# Patient Record
Sex: FEMALE | Race: OTHER | HISPANIC OR LATINO | URBAN - METROPOLITAN AREA
[De-identification: names, ages, dates, MRNs, and addresses within clinical notes are randomized per-mention and may not be internally consistent; named-entity substitution may affect disease eponyms.]

---

## 2021-07-06 ENCOUNTER — EMERGENCY (EMERGENCY)
Facility: HOSPITAL | Age: 21
LOS: 0 days | Discharge: HOME | End: 2021-07-06
Attending: EMERGENCY MEDICINE | Admitting: EMERGENCY MEDICINE
Payer: COMMERCIAL

## 2021-07-06 VITALS
TEMPERATURE: 99 F | OXYGEN SATURATION: 98 % | HEART RATE: 86 BPM | SYSTOLIC BLOOD PRESSURE: 126 MMHG | RESPIRATION RATE: 17 BRPM | DIASTOLIC BLOOD PRESSURE: 67 MMHG | WEIGHT: 103.18 LBS

## 2021-07-06 DIAGNOSIS — R20.2 PARESTHESIA OF SKIN: ICD-10-CM

## 2021-07-06 DIAGNOSIS — R20.0 ANESTHESIA OF SKIN: ICD-10-CM

## 2021-07-06 DIAGNOSIS — Z79.899 OTHER LONG TERM (CURRENT) DRUG THERAPY: ICD-10-CM

## 2021-07-06 DIAGNOSIS — G40.909 EPILEPSY, UNSPECIFIED, NOT INTRACTABLE, WITHOUT STATUS EPILEPTICUS: ICD-10-CM

## 2021-07-06 LAB
ALBUMIN SERPL ELPH-MCNC: 4.8 G/DL — SIGNIFICANT CHANGE UP (ref 3.5–5.2)
ALP SERPL-CCNC: 79 U/L — SIGNIFICANT CHANGE UP (ref 30–115)
ALT FLD-CCNC: 9 U/L — LOW (ref 14–37)
ANION GAP SERPL CALC-SCNC: 7 MMOL/L — SIGNIFICANT CHANGE UP (ref 7–14)
AST SERPL-CCNC: 13 U/L — LOW (ref 14–37)
BASOPHILS # BLD AUTO: 0.04 K/UL — SIGNIFICANT CHANGE UP (ref 0–0.2)
BASOPHILS NFR BLD AUTO: 0.5 % — SIGNIFICANT CHANGE UP (ref 0–1)
BILIRUB SERPL-MCNC: <0.2 MG/DL — SIGNIFICANT CHANGE UP (ref 0.2–1.2)
BUN SERPL-MCNC: 12 MG/DL — SIGNIFICANT CHANGE UP (ref 10–20)
CALCIUM SERPL-MCNC: 9.4 MG/DL — SIGNIFICANT CHANGE UP (ref 8.5–10.1)
CHLORIDE SERPL-SCNC: 105 MMOL/L — SIGNIFICANT CHANGE UP (ref 98–110)
CO2 SERPL-SCNC: 24 MMOL/L — SIGNIFICANT CHANGE UP (ref 17–32)
CREAT SERPL-MCNC: 0.8 MG/DL — SIGNIFICANT CHANGE UP (ref 0.7–1.5)
EOSINOPHIL # BLD AUTO: 0.09 K/UL — SIGNIFICANT CHANGE UP (ref 0–0.7)
EOSINOPHIL NFR BLD AUTO: 1.1 % — SIGNIFICANT CHANGE UP (ref 0–8)
GLUCOSE SERPL-MCNC: 76 MG/DL — SIGNIFICANT CHANGE UP (ref 70–99)
HCT VFR BLD CALC: 33.1 % — LOW (ref 37–47)
HGB BLD-MCNC: 10.3 G/DL — LOW (ref 12–16)
IMM GRANULOCYTES NFR BLD AUTO: 0.4 % — HIGH (ref 0.1–0.3)
LYMPHOCYTES # BLD AUTO: 3.2 K/UL — SIGNIFICANT CHANGE UP (ref 1.2–3.4)
LYMPHOCYTES # BLD AUTO: 37.3 % — SIGNIFICANT CHANGE UP (ref 20.5–51.1)
MAGNESIUM SERPL-MCNC: 2 MG/DL — SIGNIFICANT CHANGE UP (ref 1.8–2.4)
MCHC RBC-ENTMCNC: 23 PG — LOW (ref 27–31)
MCHC RBC-ENTMCNC: 31.1 G/DL — LOW (ref 32–37)
MCV RBC AUTO: 73.9 FL — LOW (ref 81–99)
MONOCYTES # BLD AUTO: 0.81 K/UL — HIGH (ref 0.1–0.6)
MONOCYTES NFR BLD AUTO: 9.5 % — HIGH (ref 1.7–9.3)
NEUTROPHILS # BLD AUTO: 4.4 K/UL — SIGNIFICANT CHANGE UP (ref 1.4–6.5)
NEUTROPHILS NFR BLD AUTO: 51.2 % — SIGNIFICANT CHANGE UP (ref 42.2–75.2)
NRBC # BLD: 0 /100 WBCS — SIGNIFICANT CHANGE UP (ref 0–0)
PLATELET # BLD AUTO: 349 K/UL — SIGNIFICANT CHANGE UP (ref 130–400)
POTASSIUM SERPL-MCNC: 3.8 MMOL/L — SIGNIFICANT CHANGE UP (ref 3.5–5)
POTASSIUM SERPL-SCNC: 3.8 MMOL/L — SIGNIFICANT CHANGE UP (ref 3.5–5)
PROT SERPL-MCNC: 7.2 G/DL — SIGNIFICANT CHANGE UP (ref 6–8)
RBC # BLD: 4.48 M/UL — SIGNIFICANT CHANGE UP (ref 4.2–5.4)
RBC # FLD: 18.6 % — HIGH (ref 11.5–14.5)
SODIUM SERPL-SCNC: 136 MMOL/L — SIGNIFICANT CHANGE UP (ref 135–146)
WBC # BLD: 8.57 K/UL — SIGNIFICANT CHANGE UP (ref 4.8–10.8)
WBC # FLD AUTO: 8.57 K/UL — SIGNIFICANT CHANGE UP (ref 4.8–10.8)

## 2021-07-06 PROCEDURE — 99284 EMERGENCY DEPT VISIT MOD MDM: CPT

## 2021-07-06 NOTE — ED PROVIDER NOTE - CARE PROVIDER_API CALL
Lucas Roman)  Neurology  [address] [place] [address], [address]  [place] [address]  Phone: [phone]  Fax: [phone]  Follow Up Time:

## 2021-07-06 NOTE — ED PROVIDER NOTE - OBJECTIVE STATEMENT
20F PMHx epilepsy on Vimpat and lamotrigine presents to ED for ascending burning pricking pain x 2 days that started in LEs and has ascended to mid-back today. Reports pain is intermittent, no clear triggers, comes as a sudden burning sensation in body and resolves. Unclear aggravating/alleviating factors. No chest pain, sob, abd pain, n/v/d. No weakness, no headache, no fatigue. No recent travel, no new foods. Reports she was at home the past 2 days watching TV/normal routine. Last seizure over 5 years ago.

## 2021-07-06 NOTE — ED PROVIDER NOTE - ATTENDING CONTRIBUTION TO CARE
20F PMHx epilepsy on Vimpat and lamotrigine here for eval of paresthesia's. Pt reports pricking pain to lower ext that ascends to mid back. This is associated with paresthesia's to bl upper ext. no weakness, fever, chills, recent uri, seizures recently. awake alert, sitting up in bed smiling upon approach, neck supple, perrla eomi, cn 2-12 intact, motor 5/5 X4, sensation grossly intact. normal finger to nose, normal rapid alt mvt, normal gait s1s2 ctab soft nt/nd, normal gait.       impression paresthesia's to BL arms, labs grossly wnl, pt advised neuro f/u. mom and daughter to f/u as outpt

## 2021-07-06 NOTE — ED PROVIDER NOTE - PATIENT PORTAL LINK FT
You can access the FollowMyHealth Patient Portal offered by [facility] by registering at the following website: [email or web address]. By joining [facility]’s FollowMyHealth portal, you will also be able to view your health information using other applications (apps) compatible with our system.

## 2021-07-06 NOTE — ED PEDIATRIC NURSE NOTE - OBJECTIVE STATEMENT
Pt. complains of feelings of "needle prick" all over her body. Pt. also has complaints of facial numbness. Pt. denies any other symptoms

## 2021-07-06 NOTE — ED PROVIDER NOTE - NS ED ROS FT
Constitutional: (-) diaphoresis  Eyes/ENT: (-) runny nose  Cardiovascular: (-) chest pain  Respiratory: (-) cough  Gastrointestinal: (-) vomiting, (-) diarrhea  : (-) dysuria   Musculoskeletal: (-) joint pain  Integumentary: (-) rash  Neurological: (-) LOC. see HPI  Allergic/Immunologic: (-) pruritus

## 2021-07-06 NOTE — ED PROVIDER NOTE - PHYSICAL EXAMINATION
VITAL SIGNS: I have reviewed nursing notes and confirm.  CONSTITUTIONAL: well-appearing young woman, non-toxic, NAD  SKIN: Warm dry, normal skin turgor  HEAD: NCAT  EYES: EOMI without blurry vision/pain with movement, no nystagmus PERRL, no scleral icterus  ENT: Moist mucous membranes, normal pharynx with no erythema or exudates  NECK: Supple; non tender. Full ROM. No cervical LAD  CARD: RRR, no murmurs, rubs or gallops  RESP: clear to ausculation b/l.  No rales, rhonchi, or wheezing.  ABD: soft, non-tender, non-distended, no rebound or guarding.   EXT: Full ROM, no bony tenderness, no pedal edema, no calf tenderness  NEURO: normal motor. normal sensory. CN II-XII intact. Cerebellar testing normal. Normal gait.  PSYCH: Cooperative, appropriate.

## 2021-07-06 NOTE — ED PROVIDER NOTE - NSFOLLOWUPINSTRUCTIONS_ED_ALL_ED_FT
Follow up with a neurologist. A copy of your lab results is attached.    WHAT YOU NEED TO KNOW:    Paresthesia is numbness, tingling, or burning. It can happen in any part of your body, but usually occurs in your legs, feet, arms, or hands.    DISCHARGE INSTRUCTIONS:    Return to the emergency department if:     You have severe pain along with numbness and tingling.  Your legs suddenly become cold. You have trouble moving your legs, and they ache.  You have increased weakness in a part of your body.  You have uncontrolled movements.    Contact your healthcare provider or neurologist if:     Your symptoms do not improve.  You have symptoms in more than one part of your body.  You have questions or concerns about your condition or care.    Manage paresthesia:     Protect the area from injury. You may injure or burn yourself if you lose feeling in the area. Be careful when you touch anything that could be hot. Wear sturdy shoes to protect your feet. Ask about other ways to protect yourself.   Go to physical or occupational therapy if directed. Your provider may recommend therapy if you have a condition such as carpal tunnel syndrome. A physical therapist can teach you exercises to help strengthen the area or increase your ability to move it. An occupational therapist can help you find new ways to do your daily activities.  Manage health conditions that can cause paresthesia. Work with your diabetes specialist if you have uncontrolled diabetes. A dietitian or your healthcare provider can help you create a meal plan if you have low vitamin B levels. Your provider can help you manage your health if you have multiple sclerosis or you had a stroke. It is important to manage health conditions to stop paresthesia or prevent it from getting worse.  Follow up with your healthcare provider or neurologist as directed: Your healthcare provider may refer you to a specialist. Write down your questions so you remember to ask them during your visits.

## 2021-11-23 ENCOUNTER — TRANSCRIPTION ENCOUNTER (OUTPATIENT)
Age: 21
End: 2021-11-23

## 2024-06-20 PROBLEM — Z00.00 ENCOUNTER FOR PREVENTIVE HEALTH EXAMINATION: Status: ACTIVE | Noted: 2024-06-20

## 2024-06-26 ENCOUNTER — APPOINTMENT (OUTPATIENT)
Dept: OBGYN | Facility: CLINIC | Age: 24
End: 2024-06-26
Payer: COMMERCIAL

## 2024-06-26 VITALS
WEIGHT: 118 LBS | HEIGHT: 60 IN | DIASTOLIC BLOOD PRESSURE: 75 MMHG | BODY MASS INDEX: 23.16 KG/M2 | SYSTOLIC BLOOD PRESSURE: 111 MMHG

## 2024-06-26 DIAGNOSIS — N94.6 DYSMENORRHEA, UNSPECIFIED: ICD-10-CM

## 2024-06-26 DIAGNOSIS — Z86.69 PERSONAL HISTORY OF OTHER DISEASES OF THE NERVOUS SYSTEM AND SENSE ORGANS: ICD-10-CM

## 2024-06-26 DIAGNOSIS — N92.6 IRREGULAR MENSTRUATION, UNSPECIFIED: ICD-10-CM

## 2024-06-26 DIAGNOSIS — N92.0 EXCESSIVE AND FREQUENT MENSTRUATION WITH REGULAR CYCLE: ICD-10-CM

## 2024-06-26 DIAGNOSIS — Z01.411 ENCOUNTER FOR GYNECOLOGICAL EXAMINATION (GENERAL) (ROUTINE) WITH ABNORMAL FINDINGS: ICD-10-CM

## 2024-06-26 PROCEDURE — 99213 OFFICE O/P EST LOW 20 MIN: CPT | Mod: 25

## 2024-06-26 PROCEDURE — 76830 TRANSVAGINAL US NON-OB: CPT

## 2024-06-26 PROCEDURE — 99385 PREV VISIT NEW AGE 18-39: CPT

## 2024-07-23 ENCOUNTER — NON-APPOINTMENT (OUTPATIENT)
Age: 24
End: 2024-07-23

## 2024-08-19 ENCOUNTER — APPOINTMENT (OUTPATIENT)
Dept: OBGYN | Facility: CLINIC | Age: 24
End: 2024-08-19
Payer: COMMERCIAL

## 2024-08-19 VITALS — DIASTOLIC BLOOD PRESSURE: 63 MMHG | SYSTOLIC BLOOD PRESSURE: 109 MMHG | HEIGHT: 60 IN

## 2024-08-19 DIAGNOSIS — N92.6 IRREGULAR MENSTRUATION, UNSPECIFIED: ICD-10-CM

## 2024-08-19 DIAGNOSIS — Z30.09 ENCOUNTER FOR OTHER GENERAL COUNSELING AND ADVICE ON CONTRACEPTION: ICD-10-CM

## 2024-08-19 DIAGNOSIS — N92.0 EXCESSIVE AND FREQUENT MENSTRUATION WITH REGULAR CYCLE: ICD-10-CM

## 2024-08-19 PROCEDURE — 99213 OFFICE O/P EST LOW 20 MIN: CPT

## 2024-08-19 NOTE — DISCUSSION/SUMMARY
[FreeTextEntry1] : Contraceptive options were discussed with the patient. Options include but are not limited to the option of barrier contraception, oral contraceptives,Depo Provera injections, long acting reversible contraceptives such as intrauterine devices (non-hormonal vs hormonal), and Nexplanon. The risks, benefits, and alternatives to these treatments were also discussed with the patient.  Patient request hormonal IUD Issues regarding hormonal IUDs discussed with patient including differences between Mirena versus Aury She understands risks benefits and alternatives Will order intrauterine device Patient will return for insertion of intrauterine device

## 2024-08-19 NOTE — HISTORY OF PRESENT ILLNESS
[FreeTextEntry1] : Patient is 23 years old para 0-0-0-0 last menstrual period August 8, 2024 Patient states that she has a history of painful and heavy menstrual periods Patient has a history of failed attempt for IUD placement Patient would like to discuss option of hormonal IUD placement

## 2024-08-19 NOTE — PHYSICAL EXAM
[Chaperone Present] : A chaperone was present in the examining room during all aspects of the physical examination [FreeTextEntry2] : Shelly

## 2024-09-30 DIAGNOSIS — N88.2 STRICTURE AND STENOSIS OF CERVIX UTERI: ICD-10-CM

## 2024-09-30 RX ORDER — MISOPROSTOL 200 UG/1
200 TABLET ORAL
Qty: 2 | Refills: 0 | Status: ACTIVE | COMMUNITY
Start: 2024-09-30 | End: 1900-01-01

## 2024-10-01 ENCOUNTER — APPOINTMENT (OUTPATIENT)
Dept: OBGYN | Facility: CLINIC | Age: 24
End: 2024-10-01
Payer: COMMERCIAL

## 2024-10-01 VITALS — DIASTOLIC BLOOD PRESSURE: 76 MMHG | SYSTOLIC BLOOD PRESSURE: 114 MMHG | HEIGHT: 60 IN

## 2024-10-01 DIAGNOSIS — Z30.430 ENCOUNTER FOR INSERTION OF INTRAUTERINE CONTRACEPTIVE DEVICE: ICD-10-CM

## 2024-10-01 PROCEDURE — 58300 INSERT INTRAUTERINE DEVICE: CPT

## 2024-10-01 PROCEDURE — 76830 TRANSVAGINAL US NON-OB: CPT

## 2024-10-01 NOTE — PROCEDURE
[IUD Placement] : intrauterine device (IUD) placement [Locate IUD] : locate IUD [Transvaginal Ultrasound] : transvaginal ultrasound [Anteverted] : anteverted [No Fibroid(s)] : no fibroid(s) [L: ___ cm] : L: [unfilled] cm [H: ___ cm] : H: [unfilled] cm [Time out performed] : Pre-procedure time out performed.  Patient's name, date of birth and procedure confirmed. [Consent Obtained] : Consent obtained [Prevention of Pregnancy] : prevention of pregnancy [Abnormal Uterine Bleeding] : abnormal uterine bleeding [Risks] : risks [Benefits] : benefits [Alternatives] : alternatives [Patient] : patient [Infection] : infection [Bleeding] : bleeding [Pain] : pain [Expulsion] : expulsion [Failure] : failure [Uterine Perforation] : uterine perforation [Neg Pregnancy Test] : negative pregnancy test [Neg GC/Chlamydia] : negative GC/Chlamydia [History of Unprotected [place]] : no history of unprotected intercourse [Cytotec] : Cytotec [Betadine] : Betadine [Tenaculum] : Tenaculum [Required Dilation] : required dilation [Sounded to ___ cm] : sounded to [unfilled] ~Ucm [Post Placement Transvag. US] : post placement transvaginal ultrasound [Aury IUD] : Aury IUD [Tolerated Well] : Patient tolerated the procedure well [No Complications] : No complications [Motrin/Ibuprofen] : Motrin/Ibuprofen [Other: ____] : [unfilled] [FreeTextEntry5] : Good IUD placement is noted [FreeTextEntry7] : 1.9 x 2.7 cm [FreeTextEntry8] : Not visualized [FreeTextEntry4] : Good IUD placement is noted [LMPDate] : 09/27/2024 [de-identified] : [id number] [de-identified] : 2026/AUG [de-identified] : 2029/OCT

## 2024-10-01 NOTE — PROCEDURE
[IUD Placement] : intrauterine device (IUD) placement [Locate IUD] : locate IUD [Transvaginal Ultrasound] : transvaginal ultrasound [Anteverted] : anteverted [No Fibroid(s)] : no fibroid(s) [L: ___ cm] : L: [unfilled] cm [H: ___ cm] : H: [unfilled] cm [Time out performed] : Pre-procedure time out performed.  Patient's name, date of birth and procedure confirmed. [Consent Obtained] : Consent obtained [Prevention of Pregnancy] : prevention of pregnancy [Abnormal Uterine Bleeding] : abnormal uterine bleeding [Risks] : risks [Benefits] : benefits [Alternatives] : alternatives [Patient] : patient [Infection] : infection [Bleeding] : bleeding [Pain] : pain [Expulsion] : expulsion [Failure] : failure [Uterine Perforation] : uterine perforation [Neg Pregnancy Test] : negative pregnancy test [Neg GC/Chlamydia] : negative GC/Chlamydia [History of Unprotected [place]] : no history of unprotected intercourse [Cytotec] : Cytotec [Betadine] : Betadine [Tenaculum] : Tenaculum [Required Dilation] : required dilation [Sounded to ___ cm] : sounded to [unfilled] ~Ucm [Post Placement Transvag. US] : post placement transvaginal ultrasound [Aury IUD] : Aury IUD [Tolerated Well] : Patient tolerated the procedure well [No Complications] : No complications [Motrin/Ibuprofen] : Motrin/Ibuprofen [Other: ____] : [unfilled] [FreeTextEntry5] : Good IUD placement is noted [FreeTextEntry7] : 1.9 x 2.7 cm [FreeTextEntry8] : Not visualized [FreeTextEntry4] : Good IUD placement is noted [LMPDate] : 09/27/2024 [de-identified] : [id number] [de-identified] : 2026/AUG [de-identified] : 2029/OCT

## 2024-10-02 RX ORDER — DOXYCYCLINE HYCLATE 100 MG/1
100 TABLET ORAL
Qty: 14 | Refills: 0 | Status: COMPLETED | COMMUNITY
Start: 2024-10-02 | End: 2024-10-09

## 2024-11-22 ENCOUNTER — APPOINTMENT (OUTPATIENT)
Dept: OBGYN | Facility: CLINIC | Age: 24
End: 2024-11-22

## 2025-03-06 ENCOUNTER — NON-APPOINTMENT (OUTPATIENT)
Age: 25
End: 2025-03-06

## 2025-05-23 ENCOUNTER — APPOINTMENT (OUTPATIENT)
Dept: OBGYN | Facility: CLINIC | Age: 25
End: 2025-05-23

## 2025-07-01 ENCOUNTER — APPOINTMENT (OUTPATIENT)
Dept: OBGYN | Facility: CLINIC | Age: 25
End: 2025-07-01
Payer: COMMERCIAL

## 2025-07-01 ENCOUNTER — NON-APPOINTMENT (OUTPATIENT)
Age: 25
End: 2025-07-01

## 2025-07-01 VITALS
WEIGHT: 108 LBS | HEIGHT: 61 IN | BODY MASS INDEX: 20.39 KG/M2 | SYSTOLIC BLOOD PRESSURE: 113 MMHG | DIASTOLIC BLOOD PRESSURE: 80 MMHG

## 2025-07-01 PROBLEM — N64.4 BREAST PAIN: Status: ACTIVE | Noted: 2025-07-01

## 2025-07-01 PROBLEM — Z97.5 IUD (INTRAUTERINE DEVICE) IN PLACE: Status: ACTIVE | Noted: 2025-07-01

## 2025-07-01 PROBLEM — N92.6 IRREGULAR BLEEDING: Status: ACTIVE | Noted: 2025-07-01

## 2025-07-01 PROCEDURE — 76830 TRANSVAGINAL US NON-OB: CPT

## 2025-07-01 PROCEDURE — 99395 PREV VISIT EST AGE 18-39: CPT

## 2025-07-01 PROCEDURE — 99459 PELVIC EXAMINATION: CPT

## 2025-07-01 PROCEDURE — 99213 OFFICE O/P EST LOW 20 MIN: CPT | Mod: 25

## 2025-07-01 RX ORDER — LAMOTRIGINE 300 MG/1
300 TABLET, FILM COATED, EXTENDED RELEASE ORAL
Refills: 0 | Status: ACTIVE | COMMUNITY

## 2025-07-01 RX ORDER — LACOSAMIDE 200 MG/1
TABLET, FILM COATED ORAL
Refills: 0 | Status: ACTIVE | COMMUNITY